# Patient Record
Sex: MALE | Race: WHITE | NOT HISPANIC OR LATINO | URBAN - METROPOLITAN AREA
[De-identification: names, ages, dates, MRNs, and addresses within clinical notes are randomized per-mention and may not be internally consistent; named-entity substitution may affect disease eponyms.]

---

## 2024-05-12 ENCOUNTER — APPOINTMENT (OUTPATIENT)
Dept: RADIOLOGY | Facility: HOSPITAL | Age: 49
End: 2024-05-12
Payer: COMMERCIAL

## 2024-05-12 ENCOUNTER — HOSPITAL ENCOUNTER (EMERGENCY)
Facility: HOSPITAL | Age: 49
Discharge: OTHER INSTITUTION | End: 2024-05-12
Attending: EMERGENCY MEDICINE
Payer: COMMERCIAL

## 2024-05-12 ENCOUNTER — HOSPITAL ENCOUNTER (EMERGENCY)
Facility: HOSPITAL | Age: 49
Discharge: HOME | End: 2024-05-12
Attending: STUDENT IN AN ORGANIZED HEALTH CARE EDUCATION/TRAINING PROGRAM
Payer: COMMERCIAL

## 2024-05-12 VITALS
SYSTOLIC BLOOD PRESSURE: 159 MMHG | OXYGEN SATURATION: 98 % | DIASTOLIC BLOOD PRESSURE: 88 MMHG | WEIGHT: 240 LBS | HEART RATE: 109 BPM | RESPIRATION RATE: 18 BRPM | BODY MASS INDEX: 33.6 KG/M2 | HEIGHT: 71 IN | TEMPERATURE: 98.8 F

## 2024-05-12 VITALS
DIASTOLIC BLOOD PRESSURE: 99 MMHG | SYSTOLIC BLOOD PRESSURE: 166 MMHG | RESPIRATION RATE: 16 BRPM | WEIGHT: 180 LBS | TEMPERATURE: 98.4 F | OXYGEN SATURATION: 96 % | HEART RATE: 110 BPM

## 2024-05-12 DIAGNOSIS — H05.232 PERIORBITAL HEMATOMA OF LEFT EYE: ICD-10-CM

## 2024-05-12 DIAGNOSIS — S00.83XA FACIAL HEMATOMA, INITIAL ENCOUNTER: ICD-10-CM

## 2024-05-12 DIAGNOSIS — S02.2XXA CLOSED FRACTURE OF NASAL BONE, INITIAL ENCOUNTER: ICD-10-CM

## 2024-05-12 DIAGNOSIS — S02.82XB: ICD-10-CM

## 2024-05-12 DIAGNOSIS — Z79.01 ANTICOAGULATED: ICD-10-CM
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
## 2024-05-12 DIAGNOSIS — S00.03XA HEMATOMA OF SCALP, INITIAL ENCOUNTER: Primary | ICD-10-CM

## 2024-05-12 DIAGNOSIS — S05.12XA TRAUMATIC HEMATOMA OF LEFT ORBIT, INITIAL ENCOUNTER: ICD-10-CM

## 2024-05-12 LAB
ABO GROUP (TYPE) IN BLOOD: NORMAL
AMPHETAMINES UR QL SCN: NORMAL
ANION GAP SERPL CALC-SCNC: 18 MMOL/L (ref 10–20)
ANTIBODY SCREEN: NORMAL
APTT PPP: 29 SECONDS (ref 27–38)
BARBITURATES UR QL SCN: NORMAL
BASOPHILS # BLD AUTO: 0.1 X10*3/UL (ref 0–0.1)
BASOPHILS NFR BLD AUTO: 0.5 %
BENZODIAZ UR QL SCN: NORMAL
BUN SERPL-MCNC: 16 MG/DL (ref 6–23)
BZE UR QL SCN: NORMAL
CALCIUM SERPL-MCNC: 9 MG/DL (ref 8.6–10.3)
CANNABINOIDS UR QL SCN: NORMAL
CHLORIDE SERPL-SCNC: 100 MMOL/L (ref 98–107)
CO2 SERPL-SCNC: 20 MMOL/L (ref 21–32)
CREAT SERPL-MCNC: 0.91 MG/DL (ref 0.5–1.3)
EGFRCR SERPLBLD CKD-EPI 2021: >90 ML/MIN/1.73M*2
EOSINOPHIL # BLD AUTO: 0.32 X10*3/UL (ref 0–0.7)
EOSINOPHIL NFR BLD AUTO: 1.6 %
ERYTHROCYTE [DISTWIDTH] IN BLOOD BY AUTOMATED COUNT: 13.3 % (ref 11.5–14.5)
ETHANOL SERPL-MCNC: 267 MG/DL
FENTANYL+NORFENTANYL UR QL SCN: NORMAL
GLUCOSE SERPL-MCNC: 179 MG/DL (ref 74–99)
HCT VFR BLD AUTO: 40 % (ref 41–52)
HGB BLD-MCNC: 13 G/DL (ref 13.5–17.5)
IMM GRANULOCYTES # BLD AUTO: 0.06 X10*3/UL (ref 0–0.7)
IMM GRANULOCYTES NFR BLD AUTO: 0.3 % (ref 0–0.9)
INR PPP: 0.9 (ref 0.9–1.1)
LYMPHOCYTES # BLD AUTO: 1.21 X10*3/UL (ref 1.2–4.8)
LYMPHOCYTES NFR BLD AUTO: 6.2 %
MCH RBC QN AUTO: 30.3 PG (ref 26–34)
MCHC RBC AUTO-ENTMCNC: 32.5 G/DL (ref 32–36)
MCV RBC AUTO: 93 FL (ref 80–100)
METHADONE UR QL SCN: NORMAL
MONOCYTES # BLD AUTO: 0.62 X10*3/UL (ref 0.1–1)
MONOCYTES NFR BLD AUTO: 3.2 %
NEUTROPHILS # BLD AUTO: 17.18 X10*3/UL (ref 1.2–7.7)
NEUTROPHILS NFR BLD AUTO: 88.2 %
NRBC BLD-RTO: 0 /100 WBCS (ref 0–0)
OPIATES UR QL SCN: NORMAL
OXYCODONE+OXYMORPHONE UR QL SCN: NORMAL
PCP UR QL SCN: NORMAL
PLATELET # BLD AUTO: 216 X10*3/UL (ref 150–450)
POTASSIUM SERPL-SCNC: 4.4 MMOL/L (ref 3.5–5.3)
PROTHROMBIN TIME: 10.2 SECONDS (ref 9.8–12.8)
RBC # BLD AUTO: 4.29 X10*6/UL (ref 4.5–5.9)
RH FACTOR (ANTIGEN D): NORMAL
SODIUM SERPL-SCNC: 134 MMOL/L (ref 136–145)
WBC # BLD AUTO: 19.5 X10*3/UL (ref 4.4–11.3)

## 2024-05-12 PROCEDURE — 82077 ASSAY SPEC XCP UR&BREATH IA: CPT | Performed by: EMERGENCY MEDICINE

## 2024-05-12 PROCEDURE — G0390 TRAUMA RESPONS W/HOSP CRITI: HCPCS

## 2024-05-12 PROCEDURE — 71045 X-RAY EXAM CHEST 1 VIEW: CPT

## 2024-05-12 PROCEDURE — 84520 ASSAY OF UREA NITROGEN: CPT | Performed by: EMERGENCY MEDICINE

## 2024-05-12 PROCEDURE — 85730 THROMBOPLASTIN TIME PARTIAL: CPT | Performed by: EMERGENCY MEDICINE

## 2024-05-12 PROCEDURE — 86901 BLOOD TYPING SEROLOGIC RH(D): CPT | Performed by: STUDENT IN AN ORGANIZED HEALTH CARE EDUCATION/TRAINING PROGRAM

## 2024-05-12 PROCEDURE — 2500000001 HC RX 250 WO HCPCS SELF ADMINISTERED DRUGS (ALT 637 FOR MEDICARE OP): Performed by: STUDENT IN AN ORGANIZED HEALTH CARE EDUCATION/TRAINING PROGRAM

## 2024-05-12 PROCEDURE — 70486 CT MAXILLOFACIAL W/O DYE: CPT

## 2024-05-12 PROCEDURE — 96361 HYDRATE IV INFUSION ADD-ON: CPT

## 2024-05-12 PROCEDURE — 72125 CT NECK SPINE W/O DYE: CPT

## 2024-05-12 PROCEDURE — 36415 COLL VENOUS BLD VENIPUNCTURE: CPT | Performed by: EMERGENCY MEDICINE

## 2024-05-12 PROCEDURE — 76376 3D RENDER W/INTRP POSTPROCES: CPT

## 2024-05-12 PROCEDURE — 72125 CT NECK SPINE W/O DYE: CPT | Performed by: RADIOLOGY

## 2024-05-12 PROCEDURE — 2500000004 HC RX 250 GENERAL PHARMACY W/ HCPCS (ALT 636 FOR OP/ED): Performed by: EMERGENCY MEDICINE

## 2024-05-12 PROCEDURE — 85025 COMPLETE CBC W/AUTO DIFF WBC: CPT | Performed by: EMERGENCY MEDICINE

## 2024-05-12 PROCEDURE — 80307 DRUG TEST PRSMV CHEM ANLYZR: CPT | Performed by: EMERGENCY MEDICINE

## 2024-05-12 PROCEDURE — 2500000006 HC RX 250 W HCPCS SELF ADMINISTERED DRUGS (ALT 637 FOR ALL PAYERS): Performed by: STUDENT IN AN ORGANIZED HEALTH CARE EDUCATION/TRAINING PROGRAM

## 2024-05-12 PROCEDURE — 70450 CT HEAD/BRAIN W/O DYE: CPT

## 2024-05-12 PROCEDURE — 99291 CRITICAL CARE FIRST HOUR: CPT | Performed by: STUDENT IN AN ORGANIZED HEALTH CARE EDUCATION/TRAINING PROGRAM

## 2024-05-12 PROCEDURE — 99285 EMERGENCY DEPT VISIT HI MDM: CPT | Mod: 25

## 2024-05-12 PROCEDURE — 71045 X-RAY EXAM CHEST 1 VIEW: CPT | Performed by: INTERNAL MEDICINE

## 2024-05-12 PROCEDURE — 85610 PROTHROMBIN TIME: CPT | Performed by: EMERGENCY MEDICINE

## 2024-05-12 PROCEDURE — 36415 COLL VENOUS BLD VENIPUNCTURE: CPT | Performed by: STUDENT IN AN ORGANIZED HEALTH CARE EDUCATION/TRAINING PROGRAM

## 2024-05-12 PROCEDURE — 99222 1ST HOSP IP/OBS MODERATE 55: CPT

## 2024-05-12 PROCEDURE — 96360 HYDRATION IV INFUSION INIT: CPT

## 2024-05-12 RX ORDER — METFORMIN HYDROCHLORIDE 500 MG/1
1000 TABLET ORAL ONCE
Status: COMPLETED | OUTPATIENT
Start: 2024-05-12 | End: 2024-05-12

## 2024-05-12 RX ORDER — ASPIRIN 81 MG/1
81 TABLET ORAL DAILY
COMMUNITY

## 2024-05-12 RX ORDER — OMEPRAZOLE 40 MG/1
40 CAPSULE, DELAYED RELEASE ORAL
COMMUNITY
Start: 2023-10-17 | End: 2024-10-16

## 2024-05-12 RX ORDER — ERYTHROMYCIN 5 MG/G
OINTMENT OPHTHALMIC NIGHTLY
Qty: 3.5 G | Refills: 0 | Status: SHIPPED | OUTPATIENT
Start: 2024-05-12 | End: 2024-05-22

## 2024-05-12 RX ORDER — ALBUTEROL SULFATE 0.83 MG/ML
2.5 SOLUTION RESPIRATORY (INHALATION) EVERY 4 HOURS PRN
COMMUNITY
Start: 2023-12-14 | End: 2024-12-13

## 2024-05-12 RX ORDER — HYDROCHLOROTHIAZIDE 25 MG/1
25 TABLET ORAL DAILY
COMMUNITY
Start: 2023-07-27 | End: 2024-07-26

## 2024-05-12 RX ORDER — METFORMIN HYDROCHLORIDE 1000 MG/1
1000 TABLET ORAL
COMMUNITY
Start: 2024-04-09 | End: 2025-04-09

## 2024-05-12 RX ORDER — METOPROLOL SUCCINATE 100 MG/1
100 TABLET, EXTENDED RELEASE ORAL DAILY
COMMUNITY
Start: 2023-10-10 | End: 2024-10-09

## 2024-05-12 RX ORDER — ALBUTEROL SULFATE 90 UG/1
2 AEROSOL, METERED RESPIRATORY (INHALATION) EVERY 4 HOURS PRN
COMMUNITY
Start: 2014-09-08

## 2024-05-12 RX ORDER — TICAGRELOR 90 MG/1
90 TABLET ORAL 2 TIMES DAILY
COMMUNITY
Start: 2023-06-27

## 2024-05-12 RX ORDER — EZETIMIBE 10 MG/1
10 TABLET ORAL DAILY
COMMUNITY
Start: 2023-10-11 | End: 2024-10-10

## 2024-05-12 RX ORDER — FLUTICASONE PROPIONATE AND SALMETEROL 500; 50 UG/1; UG/1
1 POWDER RESPIRATORY (INHALATION)
COMMUNITY
Start: 2015-02-03

## 2024-05-12 RX ORDER — INSULIN GLARGINE 100 [IU]/ML
19 INJECTION, SOLUTION SUBCUTANEOUS NIGHTLY
COMMUNITY
Start: 2023-06-29 | End: 2024-05-12 | Stop reason: ALTCHOICE

## 2024-05-12 RX ORDER — ATORVASTATIN CALCIUM 80 MG/1
80 TABLET, FILM COATED ORAL NIGHTLY
COMMUNITY
Start: 2023-06-28

## 2024-05-12 RX ORDER — METOPROLOL SUCCINATE 50 MG/1
100 TABLET, EXTENDED RELEASE ORAL ONCE
Status: COMPLETED | OUTPATIENT
Start: 2024-05-12 | End: 2024-05-12

## 2024-05-12 RX ORDER — INSULIN LISPRO 100 [IU]/ML
INJECTION, SOLUTION INTRAVENOUS; SUBCUTANEOUS
COMMUNITY
Start: 2023-06-29 | End: 2024-05-12 | Stop reason: ALTCHOICE

## 2024-05-12 RX ORDER — GLUCOSAM/CHON-MSM1/C/MANG/BOSW 500-416.6
1 TABLET ORAL
COMMUNITY
Start: 2023-07-03

## 2024-05-12 RX ORDER — LISINOPRIL 20 MG/1
20 TABLET ORAL DAILY
COMMUNITY
Start: 2023-06-28 | End: 2024-10-09

## 2024-05-12 RX ORDER — BLOOD SUGAR DIAGNOSTIC
1 STRIP MISCELLANEOUS
COMMUNITY
Start: 2023-07-26 | End: 2024-05-12 | Stop reason: ALTCHOICE

## 2024-05-12 RX ADMIN — METFORMIN HYDROCHLORIDE 1000 MG: 500 TABLET ORAL at 13:52

## 2024-05-12 RX ADMIN — TICAGRELOR 90 MG: 90 TABLET ORAL at 13:52

## 2024-05-12 RX ADMIN — METOPROLOL SUCCINATE 100 MG: 50 TABLET, EXTENDED RELEASE ORAL at 13:52

## 2024-05-12 RX ADMIN — SODIUM CHLORIDE 1000 ML: 9 INJECTION, SOLUTION INTRAVENOUS at 01:50

## 2024-05-12 ASSESSMENT — ENCOUNTER SYMPTOMS
HEMATOLOGIC/LYMPHATIC NEGATIVE: 1
GASTROINTESTINAL NEGATIVE: 1
CONSTITUTIONAL NEGATIVE: 1
EYES NEGATIVE: 1
MUSCULOSKELETAL NEGATIVE: 1
RESPIRATORY NEGATIVE: 1
HEADACHES: 1
ENDOCRINE NEGATIVE: 1
PSYCHIATRIC NEGATIVE: 1
FACIAL SWELLING: 1
CARDIOVASCULAR NEGATIVE: 1

## 2024-05-12 ASSESSMENT — LIFESTYLE VARIABLES
HAVE YOU EVER FELT YOU SHOULD CUT DOWN ON YOUR DRINKING: NO
TOTAL SCORE: 0
HAVE PEOPLE ANNOYED YOU BY CRITICIZING YOUR DRINKING: NO
EVER HAD A DRINK FIRST THING IN THE MORNING TO STEADY YOUR NERVES TO GET RID OF A HANGOVER: NO
EVER FELT BAD OR GUILTY ABOUT YOUR DRINKING: NO

## 2024-05-12 ASSESSMENT — COLUMBIA-SUICIDE SEVERITY RATING SCALE - C-SSRS
2. HAVE YOU ACTUALLY HAD ANY THOUGHTS OF KILLING YOURSELF?: NO
6. HAVE YOU EVER DONE ANYTHING, STARTED TO DO ANYTHING, OR PREPARED TO DO ANYTHING TO END YOUR LIFE?: NO
1. IN THE PAST MONTH, HAVE YOU WISHED YOU WERE DEAD OR WISHED YOU COULD GO TO SLEEP AND NOT WAKE UP?: NO

## 2024-05-12 ASSESSMENT — PAIN SCALES - GENERAL
PAINLEVEL_OUTOF10: 3
PAINLEVEL_OUTOF10: 0 - NO PAIN
PAINLEVEL_OUTOF10: 5 - MODERATE PAIN

## 2024-05-12 ASSESSMENT — PAIN - FUNCTIONAL ASSESSMENT
PAIN_FUNCTIONAL_ASSESSMENT: 0-10
PAIN_FUNCTIONAL_ASSESSMENT: 0-10
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
## 2024-05-12 NOTE — DISCHARGE INSTRUCTIONS
From Plastic Surgery regarding facial fractures:   You have broken (fractured) one or more bones in your face. Swelling and bruising from the injury are likely to get worse over the first couple of days. After that, the swelling should steadily improve until it is gone. If you have bruises on your face, they may change as they heal. The skin may turn from black and blue to green to yellow or brown before it returns to its normal color. It may take several weeks for your injury to heal.    Sinus Precautions x 4 weeks:  Keep head of bed elevated at all times, greater than 30 degrees  Do not blow your nose for at least two weeks  Keep head above heart level at all times  Do not forcible split  Do not smoke  Avoid holding sneezes. Sneeze with your mouth open  Do not use a straw to drink  Keep mouth open when coughing  No lifting greater than 15-20 pounds      Call with concerns or questions:  1. [phone] if Monday-Friday (8 a.m.-4:30 p.m.)  2. [phone] and ask for the Plastic Surgeon oncall if after hours or on weekends   3. Our plastic surgery office will call to schedule follow up appointment. If you do not hear from our office within 72 hours following discharge, please call our office, [phone]. Please arrive 10-15 minutes early. Bring photo ID, insurance card, discharge summary, and list of current medications and dosages. If unable to keep this appointment, call to cancel at least 24 hours prior to appointment.      Ophthalmology doctors recommend that you use erythromycin ointment before you go to bed, and will send artificial tears for you as well while this is healing.     Please remember that you will feel worse tomorrow than you feel today and you may notice additional swelling.  If you develop uncontrollable nausea or vomiting, vision changes, if it becomes difficult to wake up, or if you become disoriented or have significant neck pain, please return to the emergency department. You can  alternate Tylenol and ibuprofen as needed for pain.  Please understand that with a closed head injury, you may have concussion symptoms: Headaches and light sensitivity for several weeks.  Please allow yourself to rest, and follow-up with your primary care provider as needed.    Your swelling will be worse in the morning, which you can manage with ice packs and sleeping with your head elevated.
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
## 2024-05-12 NOTE — PROGRESS NOTES
Pharmacy Medication History Review    Seven Dave is a 48 y.o. male admitted for No Principal Problem: There is no principal problem currently on the Problem List. Please update the Problem List and refresh.. Pharmacy reviewed the patient's [id number] medications and allergies for accuracy.    The list below reflects the updated PTA list. Comments regarding how patient may be taking medications differently can be found in the Admit Orders Activity.  Prior to Admission Medications   Prescriptions Last Dose Informant Patient Reported? Taking?   Brilinta 90 mg tablet 2024 at AM Self, Other Yes Yes   Sig: Take 1 tablet (90 mg) by mouth 2 times a day.   TRUEplus Lancets 30 gauge misc Past Week Self, Other Yes Yes   Si Lancet. Use one lancet to prick finger and test blood sugar once daily or as directed.   albuterol 2.5 mg /3 mL (0.083 %) nebulizer solution Past Month Self, Other Yes Yes   Sig: Take 3 mL (2.5 mg) by nebulization every 4 hours if needed for wheezing. Inhale contents of one vial (2.5 mg)   albuterol 90 mcg/actuation inhaler Past Month Self, Other Yes Yes   Sig: Inhale 2 puffs every 4 hours if needed for wheezing or shortness of breath.   aspirin 81 mg EC tablet 2024 at AM Self, Other Yes Yes   Sig: Take 1 tablet (81 mg) by mouth once daily.   atorvastatin (Lipitor) 80 mg tablet 5/10/2024 at PM Self, Other Yes Yes   Sig: Take 1 tablet (80 mg) by mouth once daily at bedtime.   blood sugar diagnostic (TRUE METRIX GLUCOSE TEST STRIP MISC) Past Week Self, Other Yes Yes   Si strip. Use one test strip to test blood glucose once daily as directed.   ezetimibe (Zetia) 10 mg tablet 2024 Self, Other Yes Yes   Sig: Take 1 tablet (10 mg) by mouth once daily.   fluticasone propion-salmeteroL (Advair Diskus) 500-50 mcg/dose diskus inhaler Past Week Self, Other Yes Yes   Sig: Inhale 1 puff 2 times a day. Rinse mouth after each use. Discard diskus one month after removal from foil.    hydroCHLOROthiazide (HYDRODiuril) 25 mg tablet 5/11/2024 at AM Self, Other Yes Yes   Sig: Take 1 tablet (25 mg) by mouth once daily.   lisinopril 20 mg tablet 5/10/2024 at PM Self, Other Yes Yes   Sig: Take 1 tablet (20 mg) by mouth once daily.   metFORMIN (Glucophage) 1,000 mg tablet 5/11/2024 at AM Self, Other Yes Yes   Sig: Take 1 tablet (1,000 mg) by mouth 2 times a day with meals.   metoprolol succinate XL (Toprol-XL) 100 mg 24 hr tablet 5/10/2024 at PM Self, Other Yes Yes   Sig: Take 1 tablet (100 mg) by mouth once daily.   omeprazole (PriLOSEC) 40 mg DR capsule 5/11/2024 at AM Self, Other Yes Yes   Sig: Take 1 capsule (40 mg) by mouth 2 times a day before meals.      Facility-Administered Medications: None        The list below reflects the updated allergy list. Please review each documented allergy for additional clarification and justification.  Allergies  Reviewed by Odalis Diaz PharmD on 5/12/2024   No Known Allergies          Sources used to complete the med history include:  Out patient fill history - [facility]  OARRS: checked 05/12/24  Concerns: No  Chart Review  01/12/24 - ED note @ OSH ([facility])  Patient interview: conducted at bedside with patient   Historian is reliable and knowledgeable     Medication Reconciliation  Added:   ALL added, No PTA medications (see above)    Changed:   Albuterol nebulizer - not taken recently, out of refills, uses PRN  Albuterol inhaler - not taken recently, out of refills, uses PRN  Advair DISKUS 500-50 - reports taking inconsistently, has at home    Removed:   Humalog - therapy completed, pt A1c improved and insulin therapy discontinued  Lantus - therapy completed, pt A1c improved and insulin therapy discontinued  Pen needles - therapy completed, pt A1c improved and insulin therapy discontinued       Below are additional concerns with the patient's PTA list.  None        Patient declines M2B at discharge. Pharmacy has been updated to  [facility].         JAN DUBON, PharmD  PGY-1 Resident Pharmacist  Please reach out via Secure Chat for questions, or if no response call [facility] or [facility]
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
## 2024-05-13 NOTE — ED PROCEDURE NOTE
Procedure  Critical Care    Performed by: Angella Rodriguez MD  Authorized by: Angella Rodriguez MD    Critical care provider statement:     Critical care time (minutes):  35    Critical care time was exclusive of:  Separately billable procedures and treating other patients and teaching time    Critical care was necessary to treat or prevent imminent or life-threatening deterioration of the following conditions:  Trauma    Critical care was time spent personally by me on the following activities:  Examination of patient, evaluation of patient's response to treatment, discussions with consultants, development of treatment plan with patient or surrogate, obtaining history from patient or surrogate, ordering and performing treatments and interventions, ordering and review of radiographic studies, pulse oximetry and re-evaluation of patient's condition               Angella Rodriguez MD  05/13/24 5486    
Detail Level: Zone